# Patient Record
Sex: FEMALE | Race: BLACK OR AFRICAN AMERICAN | NOT HISPANIC OR LATINO
[De-identification: names, ages, dates, MRNs, and addresses within clinical notes are randomized per-mention and may not be internally consistent; named-entity substitution may affect disease eponyms.]

---

## 2019-03-28 PROBLEM — Z00.00 ENCOUNTER FOR PREVENTIVE HEALTH EXAMINATION: Status: ACTIVE | Noted: 2019-03-28

## 2019-04-03 ENCOUNTER — APPOINTMENT (OUTPATIENT)
Dept: SURGERY | Facility: CLINIC | Age: 74
End: 2019-04-03
Payer: MEDICARE

## 2019-04-03 VITALS
WEIGHT: 258 LBS | HEIGHT: 68 IN | TEMPERATURE: 97.8 F | BODY MASS INDEX: 39.1 KG/M2 | HEART RATE: 85 BPM | SYSTOLIC BLOOD PRESSURE: 145 MMHG | DIASTOLIC BLOOD PRESSURE: 82 MMHG

## 2019-04-03 DIAGNOSIS — I10 ESSENTIAL (PRIMARY) HYPERTENSION: ICD-10-CM

## 2019-04-03 PROCEDURE — 99204 OFFICE O/P NEW MOD 45 MIN: CPT

## 2019-04-03 NOTE — PLAN
[FreeTextEntry1] : I will review studies, consider needle biopsy vs exploration/excision. If this is a thyroglossal duct cyst, then excision would be indicated without biopsy. I will call the patient once I reviewed the films later this week. All questions answered.

## 2019-04-03 NOTE — PHYSICAL EXAM
[JVD] : no jugular venous distention  [Normal Thyroid] : the thyroid was normal [Carotid Bruits] : no carotid bruits [Normal Breath Sounds] : Normal breath sounds [Normal Heart Sounds] : normal heart sounds [Alert] : alert [Oriented to Person] : oriented to person [Oriented to Place] : oriented to place [Oriented to Time] : oriented to time [de-identified] : NAD [de-identified] : EOM/PERRLA sclera anicteric/no facial drooping [de-identified] : right fixed neck mass, bulge noted, nontender,

## 2019-04-03 NOTE — CONSULT LETTER
[Dear  ___] : Dear  [unfilled], [Please see my note below.] : Please see my note below. [FreeTextEntry1] : (Hilton Carpenter- I want to review the aura again but if this is a thyroglossal duct cyst, then excision is indicated. If not, then I'll consider needle biopsy first. I'll follow up with you. Thanks- Barrie)

## 2019-04-03 NOTE — HISTORY OF PRESENT ILLNESS
[de-identified] : The patient is referred by Dr. Esparza for evaluation of an enlarging right neck mass that the patient has noticed for a over a month. It is occasionally tender. She has no dysphagia or SOB and no symptoms of facial drooping or drooling. She feels well otherwise. she underwent a CT scan on 3//8/18 and thyroid ultrasound 3/18/19. There is question of a cystic structure on the right that may represent a thyroglossal duct. She does have multinodular goiter bilaterally . Within the differential on the imaging studies an enlarged lymph node or possible a muscle neoplasm (sarcoma) although this is less likely given the ultrasound findings of a cystic structure.

## 2019-04-10 ENCOUNTER — APPOINTMENT (OUTPATIENT)
Dept: SURGERY | Facility: CLINIC | Age: 74
End: 2019-04-10
Payer: MEDICARE

## 2019-04-10 VITALS
BODY MASS INDEX: 39.1 KG/M2 | TEMPERATURE: 98.2 F | HEART RATE: 91 BPM | WEIGHT: 258 LBS | HEIGHT: 68 IN | SYSTOLIC BLOOD PRESSURE: 163 MMHG | DIASTOLIC BLOOD PRESSURE: 86 MMHG

## 2019-04-10 PROCEDURE — 10006 FNA BX W/US GDN EA ADDL: CPT

## 2019-04-10 PROCEDURE — 99213 OFFICE O/P EST LOW 20 MIN: CPT | Mod: 25

## 2019-04-10 PROCEDURE — 10005 FNA BX W/US GDN 1ST LES: CPT | Mod: RT

## 2019-04-10 NOTE — HISTORY OF PRESENT ILLNESS
[de-identified] : 72 yo F referred by Dr. Call and Dr. Maki for evaluation of thyroid nodules and new right neck mass. She has a several year history of thyroid nodules, and a left one was biopsied some years ago and reported to be benign. Specifically, on the right lower pole there is a 1.1n5i5ob nodule (1.5x0.9x1.3 in 2016), a left middle pole 2.3x1.6x2.3cm nodule (previously 2.3x1.6x2.1cm in 2016) and left middle pole 1.9x1.5x1.9cm noldue (1.3x1.4x1.3cm in 2016). CT neck in 3/2019 showed a 3.8x2.8x3.1 mass adjacent to the right hyoid bone and right thyroid cartilage (without invasion into these structures). It is avascular on imaging and solid. Over the last 8-9 months, she has noticed a new right upper neck mass that started bothering her, especially when clothes a necklace rubbed it. She feels as if there is "no room" in her neck, and reports some pressure from this mass which she feels has been enlarging. She denies difficulty breathing or dysphagia. It is not sore or has been infected. SHe does report some SOB but feels it is lower in her chest. She denies changes in her voice or symptoms of hypo or hyperthyroidism. There is no history of radiation exposure. Her TSH in 2013 was 0.9 and she does not have more recent values but denies requiring thyroid medication. \par

## 2019-04-10 NOTE — PLAN
[FreeTextEntry1] : Will follow up biopsy results and discuss further management depending on these results. Should they be nondiagnostic, she may need a core needle biopsy of the right neck mass. Extent of surgery will depend on pathologic diagnosis.

## 2019-04-10 NOTE — CONSULT LETTER
[Dear  ___] : Dear  [unfilled], [Consult Letter:] : I had the pleasure of evaluating your patient, [unfilled]. [Consult Closing:] : Thank you very much for allowing me to participate in the care of this patient.  If you have any questions, please do not hesitate to contact me. [Please see my note below.] : Please see my note below. [Sincerely,] : Sincerely, [DrCory  ___] : Dr. AMADO [FreeTextEntry3] : Raisa Avelar MD

## 2019-04-10 NOTE — PHYSICAL EXAM
[Normal Rate and Rhythm] : normal rate and rhythm [Respiratory Effort] : normal respiratory effort [Oriented to Person] : oriented to person [Alert] : alert [Oriented to Place] : oriented to place [Oriented to Time] : oriented to time [Calm] : calm [de-identified] : No distress [de-identified] : palpable somewhat fixed right neck mass just anterior to submandibular gland. Under ultrasound, it appeared solid, well-circumscribed with innumerable hyperechoic foci. The thyroid nodules appeared benign on ultrasound, well-cicumscribed without microcalcifications [de-identified] : palpable bilateral thyroid nodules [de-identified] : walks with cane,left knee incision visible

## 2019-04-10 NOTE — ASSESSMENT
[FreeTextEntry1] : 74 yo F with multinodular goiter and new level II neck mass. Differential diagnosis includes thyroglossal duct cyst, thyroid neoplasm from remnant thyroid tissue or metastasis from thyroid cancer, primary muscle neoplasm. We discussed these possibilities and the need to obtain a tissue diagnosis to determine which operation is most appropriate. The patient is symptomatic from the right mass so it is reasonable to excise it, even if benign.

## 2019-04-10 NOTE — REVIEW OF SYSTEMS
[As Noted in HPI] : as noted in HPI [Fever] : no fever [Chills] : no chills [Feeling Poorly] : not feeling poorly [Feeling Tired] : not feeling tired [Hoarseness] : no hoarseness [Shortness Of Breath] : no shortness of breath [Cough] : no cough [Abdominal Pain] : no abdominal pain [FreeTextEntry5] : hypertension in office, usually normal

## 2019-04-26 ENCOUNTER — APPOINTMENT (OUTPATIENT)
Dept: SURGERY | Facility: CLINIC | Age: 74
End: 2019-04-26
Payer: MEDICARE

## 2019-04-26 VITALS
RESPIRATION RATE: 19 BRPM | HEART RATE: 92 BPM | DIASTOLIC BLOOD PRESSURE: 83 MMHG | TEMPERATURE: 98 F | BODY MASS INDEX: 38.8 KG/M2 | HEIGHT: 68 IN | WEIGHT: 256 LBS | SYSTOLIC BLOOD PRESSURE: 139 MMHG | OXYGEN SATURATION: 92 %

## 2019-04-26 PROCEDURE — 99203 OFFICE O/P NEW LOW 30 MIN: CPT | Mod: 25

## 2019-04-26 PROCEDURE — 31575 DIAGNOSTIC LARYNGOSCOPY: CPT

## 2019-04-26 RX ORDER — MULTIVITAMIN
TABLET ORAL
Refills: 0 | Status: ACTIVE | COMMUNITY

## 2019-04-26 NOTE — HISTORY OF PRESENT ILLNESS
[de-identified] : 1 year history of enlarging right neck mass. notes discomfort, dysphagia and occasional shortness of breath.  long standing (12 year) history of thyroid nodules, stable with benign cytology.  sonogram shows 4.5 cm complex right neck mass, bilateral thyroid nodules up to 1.5 cm right lobe and 2.3 cm left lobe. CT shows 38 mm mass between superior aspect thyroid cartilage and hyoid cartilage with extension into paraglottic space and displacement of carotid sheath. FNA c/w cyst contents -squamous cells and keratinocytes.

## 2019-04-26 NOTE — ASSESSMENT
[FreeTextEntry1] : discussed that needs core biopsy of mass, which may cause bleeding and further larynx edema, which I am not able to care for in the event of an emergency. asked to be evaluated by dr nicole at [facility]. d/w dr nicole and he will see pt at next office hours. instructed to go to nearest ER if becomes increasingly SOB.

## 2019-04-26 NOTE — PHYSICAL EXAM
[de-identified] : 3 cm tender right neck mass, adherent to laryngeal structures [de-identified] : 1 cm right and 2 cm left thyroid nodules, well circumscribed and mobile [Nasal Endoscopy Performed] : nasal endoscopy was performed, see procedure section for findings [Laryngoscopy Performed] : laryngoscopy was performed, see procedure section for findings [Midline] : located in midline position [Normal] : orientation to person, place, and time: normal [de-identified] : fiberoptic laryngoscopy shows edema of right AE fold with non visualization of vocal cords and floppy right arytenoid.

## 2019-04-26 NOTE — CONSULT LETTER
[Dear  ___] : Dear  [unfilled], [Consult Closing:] : Thank you very much for allowing me to participate in the care of this patient.  If you have any questions, please do not hesitate to contact me. [Please see my note below.] : Please see my note below. [Consult Letter:] : I had the pleasure of evaluating your patient, [unfilled]. [Sincerely,] : Sincerely, [FreeTextEntry2] : Dr. Raisa Avelar, Dr. Esparza [DrCory  ___] : Dr. AMADO [FreeTextEntry3] : Germán Rincon MD, FACS\par System Director, Endocrine Surgery\par [facility]\par

## 2019-04-29 ENCOUNTER — APPOINTMENT (OUTPATIENT)
Dept: OTOLARYNGOLOGY | Facility: CLINIC | Age: 74
End: 2019-04-29
Payer: MEDICARE

## 2019-04-29 VITALS
HEIGHT: 68 IN | OXYGEN SATURATION: 95 % | HEART RATE: 100 BPM | SYSTOLIC BLOOD PRESSURE: 155 MMHG | BODY MASS INDEX: 38.8 KG/M2 | WEIGHT: 256 LBS | DIASTOLIC BLOOD PRESSURE: 79 MMHG

## 2019-04-29 DIAGNOSIS — J38.00 PARALYSIS OF VOCAL CORDS AND LARYNX, UNSPECIFIED: ICD-10-CM

## 2019-04-29 DIAGNOSIS — Z86.39 PERSONAL HISTORY OF OTHER ENDOCRINE, NUTRITIONAL AND METABOLIC DISEASE: ICD-10-CM

## 2019-04-29 DIAGNOSIS — Z78.9 OTHER SPECIFIED HEALTH STATUS: ICD-10-CM

## 2019-04-29 DIAGNOSIS — R32 UNSPECIFIED URINARY INCONTINENCE: ICD-10-CM

## 2019-04-29 DIAGNOSIS — R06.02 SHORTNESS OF BREATH: ICD-10-CM

## 2019-04-29 DIAGNOSIS — R22.1 LOCALIZED SWELLING, MASS AND LUMP, NECK: ICD-10-CM

## 2019-04-29 DIAGNOSIS — J38.7 OTHER DISEASES OF LARYNX: ICD-10-CM

## 2019-04-29 DIAGNOSIS — Z87.39 PERSONAL HISTORY OF OTHER DISEASES OF THE MUSCULOSKELETAL SYSTEM AND CONNECTIVE TISSUE: ICD-10-CM

## 2019-04-29 DIAGNOSIS — Z87.891 PERSONAL HISTORY OF NICOTINE DEPENDENCE: ICD-10-CM

## 2019-04-29 DIAGNOSIS — Z87.11 PERSONAL HISTORY OF PEPTIC ULCER DISEASE: ICD-10-CM

## 2019-04-29 DIAGNOSIS — Z87.09 PERSONAL HISTORY OF OTHER DISEASES OF THE RESPIRATORY SYSTEM: ICD-10-CM

## 2019-04-29 DIAGNOSIS — Z87.898 PERSONAL HISTORY OF OTHER SPECIFIED CONDITIONS: ICD-10-CM

## 2019-04-29 DIAGNOSIS — R13.10 DYSPHAGIA, UNSPECIFIED: ICD-10-CM

## 2019-04-29 PROCEDURE — 99203 OFFICE O/P NEW LOW 30 MIN: CPT | Mod: 25

## 2019-04-29 PROCEDURE — 31575 DIAGNOSTIC LARYNGOSCOPY: CPT

## 2019-05-03 ENCOUNTER — OUTPATIENT (OUTPATIENT)
Dept: OUTPATIENT SERVICES | Facility: HOSPITAL | Age: 74
LOS: 1 days | End: 2019-05-03
Payer: MEDICARE

## 2019-05-03 ENCOUNTER — APPOINTMENT (OUTPATIENT)
Dept: RADIOLOGY | Facility: HOSPITAL | Age: 74
End: 2019-05-03

## 2019-05-03 DIAGNOSIS — R22.1 LOCALIZED SWELLING, MASS AND LUMP, NECK: ICD-10-CM

## 2019-05-06 ENCOUNTER — RESULT REVIEW (OUTPATIENT)
Age: 74
End: 2019-05-06

## 2019-05-06 LAB — NON-GYNECOLOGICAL CYTOLOGY STUDY: SIGNIFICANT CHANGE UP

## 2019-05-06 PROCEDURE — 88321 CONSLTJ&REPRT SLD PREP ELSWR: CPT

## 2019-05-07 ENCOUNTER — APPOINTMENT (OUTPATIENT)
Dept: OTOLARYNGOLOGY | Facility: HOSPITAL | Age: 74
End: 2019-05-07

## 2019-05-07 NOTE — ADDENDUM
[FreeTextEntry1] : Speech Pathology:\par \par Pt seen today in conjunction with Dr. Duval in [facility].\par \par Pt with enlarging R neck mass, noted to have a 38 mm mass between R thyroid and hyoid cartilages with extension into paraglottic space on CT scan in Mar 2019.  Pt presents today in clinic with 1 week history of dysphonia and dysphagia.  Dysphagia is characterized by pharyngeal stasis with solids which causes coughing on occasion.  Laryngoscopic exam showed significant amount of thick, foamy secretions in the pharynx/hypopharynx region.  Left TVF was mobile, could not reliably assess right TVF mobility.  A FEES was attempted but discontinued due to pr's reduced tolerance for procedure and difficulty getting a clear view 2/2 increased throat secretions.  \par \par Pt did not show overt signs/symptoms of aspiration on clinical swallow exam.  As such, pt recommended to be on a liquid diet for now pending MBSS that we will try to schedule ASAP.  Pt and daughter, who accompanied pt to this appointment, were both in agreement with this plan.\par \par Ilia MeiMS, CCC-SLP, BCS-S\par Board-Certified Specialist in Swallowing Disorders\par Senior Speech Pathologist\par

## 2019-05-07 NOTE — PROCEDURE
[Image(s) Captured] : image(s) captured and filed [Lesion] : lesion identified by mirror examination needing further evaluation [Dysphagia] : dysphagia not clearly evaluated by indirect laryngoscopy [None] : none [Flexible Endoscope] : examined with the flexible endoscope [de-identified] : flexible fiberoptic laryngoscope advanced orally, no oropharyngeal lesions identified, larynx visualized no and there is a right sided supraglottic mass with right BOT edema, paralysis of the right TVC and no movement of the arytenoid.  THe left supraglottis and glottic region is uninvolved and there is 5mm glotic opening, generalized secretions [de-identified] : mild stridor

## 2019-05-07 NOTE — HISTORY OF PRESENT ILLNESS
[de-identified] : 75 yo F referred by Dr Rincon for right sided neck mass.  She has a one year history of enlarging right neck mass. notes discomfort, dysphagia and occasional shortness of breath and since last Tuesday has had more consistent SOB.  She had a CT neck completed in March which shows 38 mm mass between superior aspect thyroid cartilage and hyoid cartilage with extension into paraglottic space and displacement of carotid sheath which may be delores in origin or cystic. An FNA was done recently which shows squamous cells and keratinocytes. She is still able to tolerate her own secretions, able to eat soft foods and drink liquids, has no weight loss but is eating less.  She has odynophagia and some mild-moderate dysphagia and has mild stridor with or without conversation\par \par Of note she has a long standing (12 year) history of thyroid nodules, stable with benign cytology. sonogram shows 4.5 cm complex right neck mass, bilateral thyroid nodules up to 1.5 cm right lobe and 2.3 cm left lobe.

## 2019-05-07 NOTE — PHYSICAL EXAM
[Midline] : trachea located in midline position [Laryngoscopy Performed] : laryngoscopy was performed, see procedure section for findings [Normal] : no rashes [de-identified] : there is a 3.4cm notable expansion of the  right neck in the paralaryngeal region/ the level II/III region which is fixed, tender and covered by normal overlying uninvolved skin.  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

## 2019-05-17 ENCOUNTER — MOBILE ON CALL (OUTPATIENT)
Age: 74
End: 2019-05-17

## 2019-06-06 ENCOUNTER — MOBILE ON CALL (OUTPATIENT)
Age: 74
End: 2019-06-06

## 2020-11-05 PROBLEM — R22.1 LOCALIZED SWELLING, MASS AND LUMP, NECK: Chronic | Status: ACTIVE | Noted: 2019-05-06

## 2020-11-18 ENCOUNTER — APPOINTMENT (OUTPATIENT)
Dept: BREAST CENTER | Facility: CLINIC | Age: 75
End: 2020-11-18
Payer: MEDICARE

## 2020-11-18 VITALS
BODY MASS INDEX: 40.01 KG/M2 | HEIGHT: 68 IN | SYSTOLIC BLOOD PRESSURE: 142 MMHG | DIASTOLIC BLOOD PRESSURE: 80 MMHG | WEIGHT: 264 LBS | HEART RATE: 58 BPM

## 2020-11-18 DIAGNOSIS — E04.2 NONTOXIC MULTINODULAR GOITER: ICD-10-CM

## 2020-11-18 DIAGNOSIS — L80 VITILIGO: ICD-10-CM

## 2020-11-18 DIAGNOSIS — Z80.0 FAMILY HISTORY OF MALIGNANT NEOPLASM OF DIGESTIVE ORGANS: ICD-10-CM

## 2020-11-18 DIAGNOSIS — R92.8 OTHER ABNORMAL AND INCONCLUSIVE FINDINGS ON DIAGNOSTIC IMAGING OF BREAST: ICD-10-CM

## 2020-11-18 DIAGNOSIS — Z78.9 OTHER SPECIFIED HEALTH STATUS: ICD-10-CM

## 2020-11-18 DIAGNOSIS — B36.9 SUPERFICIAL MYCOSIS, UNSPECIFIED: ICD-10-CM

## 2020-11-18 PROCEDURE — 99204 OFFICE O/P NEW MOD 45 MIN: CPT

## 2020-11-18 RX ORDER — NYSTATIN AND TRIAMCINOLONE ACETONIDE 100000; 1 MG/G; MG/G
100000-0.1 CREAM TOPICAL TWICE DAILY
Qty: 90 | Refills: 1 | Status: ACTIVE | COMMUNITY
Start: 2020-11-18 | End: 1900-01-01

## 2020-11-18 NOTE — REVIEW OF SYSTEMS
[Eyesight Problems] : eyesight problems [Eyes Itch] : itching of the eyes [Palpitations] : palpitations [SOB on Exertion] : shortness of breath during exertion [Abdominal Pain] : abdominal pain [Incontinence] : incontinence [Joint Pain] : joint pain [Joint Stiffness] : joint stiffness [Limb Pain] : limb pain [Skin Wound] : skin wound [Difficulty Walking] : difficulty walking [Negative] : Heme/Lymph

## 2020-11-25 RX ORDER — RANITIDINE 75 MG/1
TABLET ORAL
Refills: 0 | Status: ACTIVE | COMMUNITY

## 2020-11-25 RX ORDER — FLUTICASONE PROPIONATE 50 MCG
SPRAY, SUSPENSION NASAL
Refills: 0 | Status: ACTIVE | COMMUNITY

## 2020-11-25 NOTE — PHYSICAL EXAM
[Normocephalic] : normocephalic [Atraumatic] : atraumatic [Supple] : supple [No Supraclavicular Adenopathy] : no supraclavicular adenopathy [Examined in the supine and seated position] : examined in the supine and seated position [No dominant masses] : no dominant masses in right breast  [No dominant masses] : no dominant masses left breast [No Nipple Retraction] : no left nipple retraction [No Nipple Discharge] : no left nipple discharge [No Axillary Lymphadenopathy] : no left axillary lymphadenopathy [No Edema] : no edema [No Rashes] : no rashes [No Ulceration] : no ulceration [Soft] : abdomen soft [Not Tender] : non-tender [de-identified] : dry skin

## 2020-11-25 NOTE — HISTORY OF PRESENT ILLNESS
[FreeTextEntry1] : This is a 75 year old female referred by Dr. Esparza for abnormal imaging. The patient had screening imaging on 8/8/2020 and an asymmetry was seen in right breast then diagnostic imaging showed no ultrasound correlate to the asymmetry and she had breast MRI at [facility]. She presents for imaging review.\par She is a retired LPN.\par \par She has no breast complaints today.\par She does SBE. \par She has not noticed a change in her breast or a breast lump.\par She has not noticed a change in her nipple or nipple area.\par She has not noticed a change in the skin of the breast.\par She is not experiencing nipple discharge.\par She is not experiencing breast pain.\par She has not noticed a lump or lymph node under the armpit. \par \par BREAST CANCER RISK FACTORS\par Menarche: 14\par Date of LMP: 2002\par Menopause: post age 57\par Grav:  5    Para: 3\par Age at first live birth: 23\par Nursed: no\par Hysterectomy: no\par Oophorectomy:  yes unilateral 1996\par OCP: yes in the past for approximately 2 years\par HRT: no\par Last pap/pelvic exam: 2018 WNL \par Related family history: mother pancreatic cancer 70's, possible also breast cancer\par Ashkenazi: no \par Mastery risk assessment: BRCAPRO 4.9%, TCv7 10.8%, TCv8 11.1%, Micki 6.6%, Moise 1.9%\par BRCA testing: no\par Bra size: 44C\par \par Last mammogram: 8/25/2020  right      Location: [facility] \par Report reviewed.                                 Images reviewed.\par Results: BIRADS 0\par scattered fibroglandular tissue. In the right inner breast, 4cm from the nipple there is a 4mm circumscribed  asymmetry. in the right inner breast, 9.5cm from the nipple, there is an elongated asymmetry spanning 6mm.\par \par Last ultrasound: 8/25/2020 right           Location: [facility] \par Report reviewed.                                 Images reviewed. \par Results: BIRADS 2\par in the right 5:00 axis, 3cm from the nipple there is a cyst measuring 0.4 x 0.3 x 0.2cm. In the right 3:00 axis, 4cm from the nipple, there is a cyst measuring 0.3 x 0.4 x 0.2cm. This likely corresponds with the mammographic finding. \par \par Last MRI:   11/6/2020                           Location: [facility]\par Report reviewed                                                \par Results: NO evidence of malignancy bilaterally. BIRADS 2

## 2020-11-25 NOTE — CONSULT LETTER
[Dear  ___] : Dear  [unfilled], [( Thank you for referring [unfilled] for consultation for _____ )] : Thank you for referring [unfilled] for consultation for [unfilled] [Please see my note below.] : Please see my note below. [Consult Closing:] : Thank you very much for allowing me to participate in the care of this patient.  If you have any questions, please do not hesitate to contact me. [Sincerely,] : Sincerely, [FreeTextEntry3] : Ashely Marquez MS DO\par Breast Surgeon\par [facility] \[place] [address]\par

## 2020-11-25 NOTE — ASSESSMENT
[FreeTextEntry1] : 76 yo female with abnormal imaging\par I am recommend 2nd op review by Dr. Duckworth of the MRI, I explained to Willy that we are limited with outside read of MRI but that I would want to make sure the radiologist sees the MRI\par reassured her that her PE is WNL\par reviewed her risk status, she is not high risk\par We reviewed risk reduction strategies including maintaining a BMI <25, limiting red meat intake and alcoholic beverages to 3 per week and exercise (150 min/ week low intensity or 75 min/week high intensity). And maintaining a normal vitamin D level.\par \par I will call her with imaging review and recommendations.\par Also reviewed with her that [facility] has a new MRI machine\par \par reviewed option of genetic counseling/testing she is interested and referral to [facility] given history of pancreatic and breast cancer in her mother\par \par Will devise follow up plan pending radiology review.\par She knows to call or return sooner should any concerns or questions arise.\par

## 2020-12-09 ENCOUNTER — NON-APPOINTMENT (OUTPATIENT)
Age: 75
End: 2020-12-09

## 2022-11-04 ENCOUNTER — OFFICE (OUTPATIENT)
Dept: URBAN - METROPOLITAN AREA CLINIC 122 | Facility: CLINIC | Age: 77
Setting detail: OPHTHALMOLOGY
End: 2022-11-04
Payer: MEDICARE

## 2022-11-04 DIAGNOSIS — H02.825: ICD-10-CM

## 2022-11-04 DIAGNOSIS — H02.822: ICD-10-CM

## 2022-11-04 PROCEDURE — 92012 INTRM OPH EXAM EST PATIENT: CPT | Performed by: OPHTHALMOLOGY

## 2022-11-04 ASSESSMENT — REFRACTION_AUTOREFRACTION
OS_CYLINDER: -0.75
OD_AXIS: 150
OD_SPHERE: +1.50
OS_SPHERE: +1.50
OS_AXIS: 48
OD_CYLINDER: -0.75

## 2022-11-04 ASSESSMENT — REFRACTION_CURRENTRX
OS_CYLINDER: SPH
OD_VPRISM_DIRECTION: SV
OD_CYLINDER: SPH
OS_SPHERE: +2.50
OD_SPHERE: +2.50
OS_OVR_VA: 20/
OD_OVR_VA: 20/
OS_VPRISM_DIRECTION: SV

## 2022-11-04 ASSESSMENT — LID EXAM ASSESSMENTS
OD_BLEPHARITIS: RUL 3+
OS_BLEPHARITIS: LUL 3+

## 2022-11-04 ASSESSMENT — REFRACTION_MANIFEST
OS_CYLINDER: -0.75
OS_ADD: +2.50
OD_VA1: 20/30+
OS_AXIS: 50
OD_AXIS: 150
OD_SPHERE: +1.50
OD_CYLINDER: -0.75
OS_VA1: 20/30+
OD_ADD: +2.50
OS_SPHERE: +1.50

## 2022-11-04 ASSESSMENT — SUPERFICIAL PUNCTATE KERATITIS (SPK)
OD_SPK: 2+
OS_SPK: 2+

## 2022-11-04 ASSESSMENT — SPHEQUIV_DERIVED
OD_SPHEQUIV: 1.125
OS_SPHEQUIV: 1.125
OS_SPHEQUIV: 1.125
OD_SPHEQUIV: 1.125

## 2022-11-04 ASSESSMENT — VISUAL ACUITY
OD_BCVA: 20/30-2
OS_BCVA: 20/40-2

## 2023-04-19 ENCOUNTER — OFFICE (OUTPATIENT)
Dept: URBAN - METROPOLITAN AREA CLINIC 122 | Facility: CLINIC | Age: 78
Setting detail: OPHTHALMOLOGY
End: 2023-04-19
Payer: MEDICARE

## 2023-04-19 DIAGNOSIS — H16.223: ICD-10-CM

## 2023-04-19 DIAGNOSIS — H02.822: ICD-10-CM

## 2023-04-19 DIAGNOSIS — H02.825: ICD-10-CM

## 2023-04-19 DIAGNOSIS — H01.004: ICD-10-CM

## 2023-04-19 DIAGNOSIS — H01.001: ICD-10-CM

## 2023-04-19 PROCEDURE — 68761 CLOSE TEAR DUCT OPENING: CPT | Performed by: OPHTHALMOLOGY

## 2023-04-19 PROCEDURE — 92012 INTRM OPH EXAM EST PATIENT: CPT | Performed by: OPHTHALMOLOGY

## 2023-04-19 ASSESSMENT — VISUAL ACUITY
OS_BCVA: 20/30-
OD_BCVA: 20/30+

## 2023-04-19 ASSESSMENT — LID EXAM ASSESSMENTS
OD_BLEPHARITIS: RUL 3+
OS_BLEPHARITIS: LUL 3+

## 2023-04-19 ASSESSMENT — SPHEQUIV_DERIVED
OD_SPHEQUIV: 1.125
OS_SPHEQUIV: 1.125
OS_SPHEQUIV: 1.125
OD_SPHEQUIV: 1.125

## 2023-04-19 ASSESSMENT — REFRACTION_CURRENTRX
OD_VPRISM_DIRECTION: SV
OS_SPHERE: +2.50
OS_OVR_VA: 20/
OD_SPHERE: +2.50
OS_CYLINDER: SPH
OD_CYLINDER: SPH
OS_VPRISM_DIRECTION: SV
OD_OVR_VA: 20/

## 2023-04-19 ASSESSMENT — SUPERFICIAL PUNCTATE KERATITIS (SPK)
OD_SPK: 2+
OS_SPK: 2+

## 2023-04-19 ASSESSMENT — REFRACTION_MANIFEST
OS_AXIS: 50
OD_CYLINDER: -0.75
OD_AXIS: 150
OS_ADD: +2.50
OD_ADD: +2.50
OD_SPHERE: +1.50
OS_SPHERE: +1.50
OS_VA1: 20/30+
OD_VA1: 20/30+
OS_CYLINDER: -0.75

## 2023-04-19 ASSESSMENT — REFRACTION_AUTOREFRACTION
OD_AXIS: 150
OD_SPHERE: +1.50
OD_CYLINDER: -0.75
OS_CYLINDER: -0.75
OS_AXIS: 48
OS_SPHERE: +1.50

## 2023-10-10 ENCOUNTER — OFFICE (OUTPATIENT)
Dept: URBAN - METROPOLITAN AREA CLINIC 122 | Facility: CLINIC | Age: 78
Setting detail: OPHTHALMOLOGY
End: 2023-10-10
Payer: MEDICARE

## 2023-10-10 DIAGNOSIS — H02.825: ICD-10-CM

## 2023-10-10 DIAGNOSIS — H01.004: ICD-10-CM

## 2023-10-10 DIAGNOSIS — H02.822: ICD-10-CM

## 2023-10-10 DIAGNOSIS — H16.223: ICD-10-CM

## 2023-10-10 DIAGNOSIS — H01.001: ICD-10-CM

## 2023-10-10 PROCEDURE — 68761 CLOSE TEAR DUCT OPENING: CPT | Mod: 50 | Performed by: OPHTHALMOLOGY

## 2023-10-10 PROCEDURE — 99204 OFFICE O/P NEW MOD 45 MIN: CPT | Mod: 25 | Performed by: OPHTHALMOLOGY

## 2023-10-10 ASSESSMENT — REFRACTION_MANIFEST
OS_SPHERE: +1.50
OD_SPHERE: +1.50
OD_ADD: +2.50
OD_SPHERE: +0.75
OS_CYLINDER: -0.75
OD_VA1: 20/40
OD_CYLINDER: -0.75
OS_VA1: 20/40
OS_ADD: +2.50
OS_VA1: 20/30+
OD_AXIS: 150
OS_CYLINDER: -0.75
OS_AXIS: 40
OD_VA1: 20/30+
OD_CYLINDER: SPH
OD_ADD: +2.50
OS_ADD: +2.50
OS_AXIS: 50
OS_SPHERE: +1.50

## 2023-10-10 ASSESSMENT — SPHEQUIV_DERIVED
OS_SPHEQUIV: 1.125
OS_SPHEQUIV: 1.125
OD_SPHEQUIV: 1.125
OD_SPHEQUIV: 1.125
OS_SPHEQUIV: 1.125

## 2023-10-10 ASSESSMENT — REFRACTION_CURRENTRX
OD_SPHERE: +2.50
OS_SPHERE: +2.50
OD_CYLINDER: SPH
OD_OVR_VA: 20/
OS_OVR_VA: 20/
OS_VPRISM_DIRECTION: SV
OS_CYLINDER: SPH
OD_VPRISM_DIRECTION: SV

## 2023-10-10 ASSESSMENT — SUPERFICIAL PUNCTATE KERATITIS (SPK)
OS_SPK: 2+
OD_SPK: 2+

## 2023-10-10 ASSESSMENT — REFRACTION_AUTOREFRACTION
OS_CYLINDER: -0.75
OS_AXIS: 48
OD_SPHERE: +1.50
OD_CYLINDER: -0.75
OD_AXIS: 150
OS_SPHERE: +1.50

## 2023-10-10 ASSESSMENT — CONFRONTATIONAL VISUAL FIELD TEST (CVF)
OD_FINDINGS: FULL
OS_FINDINGS: FULL

## 2023-10-10 ASSESSMENT — LID EXAM ASSESSMENTS
OD_BLEPHARITIS: RUL 3+
OS_BLEPHARITIS: LUL 3+

## 2023-10-10 ASSESSMENT — VISUAL ACUITY
OD_BCVA: 20/50
OS_BCVA: 20/50+2

## 2023-10-16 ENCOUNTER — OFFICE (OUTPATIENT)
Dept: URBAN - METROPOLITAN AREA CLINIC 122 | Facility: CLINIC | Age: 78
Setting detail: OPHTHALMOLOGY
End: 2023-10-16
Payer: MEDICARE

## 2023-10-16 DIAGNOSIS — H35.363: ICD-10-CM

## 2023-10-16 DIAGNOSIS — H16.223: ICD-10-CM

## 2023-10-16 DIAGNOSIS — H02.822: ICD-10-CM

## 2023-10-16 DIAGNOSIS — H01.004: ICD-10-CM

## 2023-10-16 DIAGNOSIS — H25.13: ICD-10-CM

## 2023-10-16 DIAGNOSIS — H25.12: ICD-10-CM

## 2023-10-16 DIAGNOSIS — H02.825: ICD-10-CM

## 2023-10-16 DIAGNOSIS — H01.001: ICD-10-CM

## 2023-10-16 PROBLEM — H25.11 CATARACT SENILE NUCLEAR SCLEROSIS; RIGHT EYE, LEFT EYE, BOTH EYES: Status: ACTIVE | Noted: 2023-10-16

## 2023-10-16 PROCEDURE — 99214 OFFICE O/P EST MOD 30 MIN: CPT | Mod: 24 | Performed by: OPHTHALMOLOGY

## 2023-10-16 PROCEDURE — 92136 OPHTHALMIC BIOMETRY: CPT | Mod: LT | Performed by: OPHTHALMOLOGY

## 2023-10-16 PROCEDURE — 92134 CPTRZ OPH DX IMG PST SGM RTA: CPT | Performed by: OPHTHALMOLOGY

## 2023-10-16 ASSESSMENT — REFRACTION_MANIFEST
OS_SPHERE: +1.50
OD_AXIS: 150
OD_ADD: +2.50
OD_ADD: +2.50
OS_AXIS: 40
OD_VA1: 20/30+
OS_ADD: +2.50
OS_CYLINDER: -0.75
OS_ADD: +2.50
OD_CYLINDER: SPH
OS_CYLINDER: -0.75
OD_CYLINDER: -0.75
OD_SPHERE: +0.75
OS_SPHERE: +1.50
OD_SPHERE: +1.50
OD_VA1: 20/40
OS_VA1: 20/30+
OS_AXIS: 50
OS_VA1: 20/40

## 2023-10-16 ASSESSMENT — LID EXAM ASSESSMENTS
OD_BLEPHARITIS: RUL 3+
OS_BLEPHARITIS: LUL 3+

## 2023-10-16 ASSESSMENT — REFRACTION_CURRENTRX
OD_CYLINDER: SPH
OD_OVR_VA: 20/
OS_VPRISM_DIRECTION: SV
OS_SPHERE: +2.50
OS_CYLINDER: SPH
OS_OVR_VA: 20/
OD_SPHERE: +2.50
OD_VPRISM_DIRECTION: SV

## 2023-10-16 ASSESSMENT — SPHEQUIV_DERIVED
OS_SPHEQUIV: 1.125
OD_SPHEQUIV: 1.125
OS_SPHEQUIV: 1.125
OD_SPHEQUIV: 1.125
OS_SPHEQUIV: 1.125

## 2023-10-16 ASSESSMENT — SUPERFICIAL PUNCTATE KERATITIS (SPK)
OD_SPK: 2+
OS_SPK: 2+

## 2023-10-16 ASSESSMENT — VISUAL ACUITY
OS_BCVA: 20/50+2
OD_BCVA: 20/50

## 2023-10-16 ASSESSMENT — REFRACTION_AUTOREFRACTION
OS_AXIS: 48
OD_AXIS: 150
OD_CYLINDER: -0.75
OS_SPHERE: +1.50
OS_CYLINDER: -0.75
OD_SPHERE: +1.50

## 2023-11-09 ENCOUNTER — AMBULATORY SURGERY CENTER (OUTPATIENT)
Dept: URBAN - METROPOLITAN AREA SURGERY 16 | Facility: SURGERY | Age: 78
Setting detail: OPHTHALMOLOGY
End: 2023-11-09
Payer: MEDICARE

## 2023-11-09 DIAGNOSIS — H25.12: ICD-10-CM

## 2023-11-09 PROCEDURE — 66984 XCAPSL CTRC RMVL W/O ECP: CPT | Mod: LT | Performed by: OPHTHALMOLOGY

## 2023-11-10 ENCOUNTER — RX ONLY (RX ONLY)
Age: 78
End: 2023-11-10

## 2023-11-10 ENCOUNTER — OFFICE (OUTPATIENT)
Dept: URBAN - METROPOLITAN AREA CLINIC 122 | Facility: CLINIC | Age: 78
Setting detail: OPHTHALMOLOGY
End: 2023-11-10
Payer: MEDICARE

## 2023-11-10 DIAGNOSIS — Z96.1: ICD-10-CM

## 2023-11-10 PROCEDURE — 99024 POSTOP FOLLOW-UP VISIT: CPT | Performed by: OPHTHALMOLOGY

## 2023-11-10 ASSESSMENT — REFRACTION_MANIFEST
OS_ADD: +2.50
OS_AXIS: 40
OS_VA1: 20/30+
OD_CYLINDER: -0.75
OD_ADD: +2.50
OS_SPHERE: +1.50
OS_CYLINDER: -0.75
OD_ADD: +2.50
OD_CYLINDER: SPH
OD_SPHERE: +1.50
OS_SPHERE: +1.50
OS_AXIS: 50
OD_SPHERE: +0.75
OD_VA1: 20/30+
OD_VA1: 20/40
OS_CYLINDER: -0.75
OD_AXIS: 150
OS_VA1: 20/40
OS_ADD: +2.50

## 2023-11-10 ASSESSMENT — REFRACTION_AUTOREFRACTION
OS_SPHERE: PLANO
OD_AXIS: 150
OD_CYLINDER: -0.75
OS_AXIS: 12
OS_CYLINDER: -1.00
OD_SPHERE: +1.50

## 2023-11-10 ASSESSMENT — REFRACTION_CURRENTRX
OS_VPRISM_DIRECTION: SV
OD_VPRISM_DIRECTION: SV
OS_CYLINDER: SPH
OD_CYLINDER: SPH
OS_OVR_VA: 20/
OD_SPHERE: +2.50
OS_SPHERE: +2.50
OD_OVR_VA: 20/

## 2023-11-10 ASSESSMENT — SUPERFICIAL PUNCTATE KERATITIS (SPK)
OD_SPK: 2+
OS_SPK: 2+

## 2023-11-10 ASSESSMENT — SPHEQUIV_DERIVED
OD_SPHEQUIV: 1.125
OS_SPHEQUIV: 1.125
OS_SPHEQUIV: 1.125
OD_SPHEQUIV: 1.125

## 2023-11-10 ASSESSMENT — LID EXAM ASSESSMENTS
OD_BLEPHARITIS: RUL 3+
OS_BLEPHARITIS: LUL 3+

## 2023-11-20 ENCOUNTER — OFFICE (OUTPATIENT)
Dept: URBAN - METROPOLITAN AREA CLINIC 122 | Facility: CLINIC | Age: 78
Setting detail: OPHTHALMOLOGY
End: 2023-11-20
Payer: MEDICARE

## 2023-11-20 DIAGNOSIS — H25.11: ICD-10-CM

## 2023-11-20 PROBLEM — Z96.1 PSEUDOPHAKIA-2 WEEKS P/O CAT W/ IOL ; LEFT EYE: Status: ACTIVE | Noted: 2023-11-10

## 2023-11-20 PROCEDURE — 92136 OPHTHALMIC BIOMETRY: CPT | Mod: RT | Performed by: OPHTHALMOLOGY

## 2023-11-20 ASSESSMENT — REFRACTION_MANIFEST
OS_SPHERE: +1.50
OD_SPHERE: +1.50
OD_ADD: +2.50
OS_CYLINDER: -0.75
OD_CYLINDER: SPH
OD_AXIS: 150
OD_ADD: +2.50
OS_ADD: +2.50
OS_AXIS: 40
OD_VA1: 20/40
OS_VA1: 20/30+
OD_CYLINDER: -0.75
OS_AXIS: 50
OD_VA1: 20/30+
OS_VA1: 20/40
OS_ADD: +2.50
OD_SPHERE: +0.75
OS_SPHERE: +1.50
OS_CYLINDER: -0.75

## 2023-11-20 ASSESSMENT — SUPERFICIAL PUNCTATE KERATITIS (SPK)
OD_SPK: 2+
OS_SPK: 2+

## 2023-11-20 ASSESSMENT — REFRACTION_CURRENTRX
OS_CYLINDER: SPH
OS_SPHERE: +2.50
OD_VPRISM_DIRECTION: SV
OD_CYLINDER: SPH
OS_VPRISM_DIRECTION: SV
OD_SPHERE: +2.50
OS_OVR_VA: 20/
OD_OVR_VA: 20/

## 2023-11-20 ASSESSMENT — LID EXAM ASSESSMENTS
OS_BLEPHARITIS: LUL 3+
OD_BLEPHARITIS: RUL 3+

## 2023-11-20 ASSESSMENT — SPHEQUIV_DERIVED
OS_SPHEQUIV: 1.125
OS_SPHEQUIV: 1.125
OD_SPHEQUIV: 0.875
OD_SPHEQUIV: 1.125

## 2023-11-20 ASSESSMENT — REFRACTION_AUTOREFRACTION
OS_AXIS: 24
OD_AXIS: 167
OS_CYLINDER: -0.50
OD_SPHERE: +1.00
OS_SPHERE: PLANO
OD_CYLINDER: -0.25

## 2023-12-14 ENCOUNTER — AMBULATORY SURGERY CENTER (OUTPATIENT)
Dept: URBAN - METROPOLITAN AREA SURGERY 16 | Facility: SURGERY | Age: 78
Setting detail: OPHTHALMOLOGY
End: 2023-12-14
Payer: MEDICARE

## 2023-12-14 DIAGNOSIS — H25.11: ICD-10-CM

## 2023-12-14 PROCEDURE — 66984 XCAPSL CTRC RMVL W/O ECP: CPT | Mod: 79,RT | Performed by: OPHTHALMOLOGY

## 2023-12-15 ENCOUNTER — OFFICE (OUTPATIENT)
Dept: URBAN - METROPOLITAN AREA CLINIC 122 | Facility: CLINIC | Age: 78
Setting detail: OPHTHALMOLOGY
End: 2023-12-15
Payer: MEDICARE

## 2023-12-15 DIAGNOSIS — Z96.1: ICD-10-CM

## 2023-12-15 PROCEDURE — 99024 POSTOP FOLLOW-UP VISIT: CPT | Performed by: OPHTHALMOLOGY

## 2023-12-15 ASSESSMENT — REFRACTION_AUTOREFRACTION
OD_AXIS: 171
OS_SPHERE: PLANO
OD_SPHERE: PLANO
OS_CYLINDER: -0.50
OD_CYLINDER: -0.75
OS_AXIS: 36

## 2023-12-15 ASSESSMENT — REFRACTION_MANIFEST
OS_SPHERE: +1.50
OS_SPHERE: +1.50
OS_AXIS: 40
OD_ADD: +2.50
OS_CYLINDER: -0.75
OS_ADD: +2.50
OD_CYLINDER: SPH
OD_SPHERE: +0.75
OD_SPHERE: +1.50
OD_VA1: 20/30+
OD_AXIS: 150
OS_VA1: 20/40
OS_VA1: 20/30+
OD_VA1: 20/40
OD_ADD: +2.50
OD_CYLINDER: -0.75
OS_AXIS: 50
OS_CYLINDER: -0.75
OS_ADD: +2.50

## 2023-12-15 ASSESSMENT — REFRACTION_CURRENTRX
OD_CYLINDER: SPH
OD_VPRISM_DIRECTION: SV
OD_SPHERE: +2.50
OS_OVR_VA: 20/
OS_CYLINDER: SPH
OS_SPHERE: +2.50
OD_OVR_VA: 20/
OS_VPRISM_DIRECTION: SV

## 2023-12-15 ASSESSMENT — SUPERFICIAL PUNCTATE KERATITIS (SPK)
OD_SPK: 2+
OS_SPK: 2+

## 2023-12-15 ASSESSMENT — LID EXAM ASSESSMENTS
OS_BLEPHARITIS: LUL 3+
OD_BLEPHARITIS: RUL 3+

## 2023-12-15 ASSESSMENT — CONFRONTATIONAL VISUAL FIELD TEST (CVF)
OD_FINDINGS: FULL
OS_FINDINGS: FULL

## 2023-12-15 ASSESSMENT — SPHEQUIV_DERIVED
OS_SPHEQUIV: 1.125
OD_SPHEQUIV: 1.125
OS_SPHEQUIV: 1.125

## 2024-01-10 ENCOUNTER — OFFICE (OUTPATIENT)
Dept: URBAN - METROPOLITAN AREA CLINIC 122 | Facility: CLINIC | Age: 79
Setting detail: OPHTHALMOLOGY
End: 2024-01-10
Payer: MEDICARE

## 2024-01-10 DIAGNOSIS — Z96.1: ICD-10-CM

## 2024-01-10 PROCEDURE — 99024 POSTOP FOLLOW-UP VISIT: CPT

## 2024-01-10 ASSESSMENT — REFRACTION_MANIFEST
OS_SPHERE: +1.50
OD_VA1: 20/40
OS_VA1: 20/40
OD_SPHERE: +1.50
OD_VA1: 20/30+
OD_ADD: +2.50
OS_CYLINDER: -0.75
OS_VA1: 20/30+
OD_ADD: +2.50
OD_CYLINDER: -0.75
OS_ADD: +2.50
OD_SPHERE: +0.75
OS_AXIS: 50
OS_SPHERE: +1.50
OS_SPHERE: PLANO
OD_CYLINDER: SPH
OD_AXIS: 150
OS_AXIS: 40
OS_ADD: +2.50
OS_ADD: +2.50
OD_ADD: +2.50
OD_SPHERE: PLANO
OS_CYLINDER: -0.75

## 2024-01-10 ASSESSMENT — REFRACTION_CURRENTRX
OS_CYLINDER: SPH
OS_OVR_VA: 20/
OD_CYLINDER: SPH
OD_VPRISM_DIRECTION: SV
OS_SPHERE: +2.50
OS_VPRISM_DIRECTION: SV
OD_SPHERE: +2.50
OD_OVR_VA: 20/

## 2024-01-10 ASSESSMENT — REFRACTION_AUTOREFRACTION
OD_SPHERE: +0.75
OD_AXIS: 156
OS_AXIS: 10
OS_SPHERE: +0.25
OD_CYLINDER: -0.75
OS_CYLINDER: -0.75

## 2024-01-10 ASSESSMENT — LID EXAM ASSESSMENTS
OS_BLEPHARITIS: LUL 3+
OD_BLEPHARITIS: RUL 3+

## 2024-01-10 ASSESSMENT — SPHEQUIV_DERIVED
OS_SPHEQUIV: -0.125
OS_SPHEQUIV: 1.125
OD_SPHEQUIV: 0.375
OS_SPHEQUIV: 1.125
OD_SPHEQUIV: 1.125

## 2024-01-10 ASSESSMENT — SUPERFICIAL PUNCTATE KERATITIS (SPK)
OS_SPK: 2+
OD_SPK: 2+

## 2024-03-13 ENCOUNTER — OFFICE (OUTPATIENT)
Dept: URBAN - METROPOLITAN AREA CLINIC 122 | Facility: CLINIC | Age: 79
Setting detail: OPHTHALMOLOGY
End: 2024-03-13
Payer: MEDICARE

## 2024-03-13 DIAGNOSIS — H02.822: ICD-10-CM

## 2024-03-13 DIAGNOSIS — H02.825: ICD-10-CM

## 2024-03-13 DIAGNOSIS — H16.223: ICD-10-CM

## 2024-03-13 DIAGNOSIS — H01.001: ICD-10-CM

## 2024-03-13 DIAGNOSIS — H35.363: ICD-10-CM

## 2024-03-13 PROBLEM — E11.9 DIABETES TYPE 2 NO RETINOPATHY ; BOTH EYES: Status: ACTIVE | Noted: 2024-03-13

## 2024-03-13 PROCEDURE — 92014 COMPRE OPH EXAM EST PT 1/>: CPT | Mod: 24,25 | Performed by: OPHTHALMOLOGY

## 2024-03-13 PROCEDURE — 92250 FUNDUS PHOTOGRAPHY W/I&R: CPT | Performed by: OPHTHALMOLOGY

## 2024-03-13 PROCEDURE — 68761 CLOSE TEAR DUCT OPENING: CPT | Mod: 79,50 | Performed by: OPHTHALMOLOGY

## 2024-03-13 ASSESSMENT — SPHEQUIV_DERIVED
OS_SPHEQUIV: 1.125
OD_SPHEQUIV: 1.125
OS_SPHEQUIV: 1.125

## 2024-03-13 ASSESSMENT — REFRACTION_CURRENTRX
OS_CYLINDER: SPH
OS_VPRISM_DIRECTION: SV
OD_SPHERE: +2.50
OS_SPHERE: +2.50
OD_CYLINDER: SPH
OD_OVR_VA: 20/
OS_OVR_VA: 20/
OD_VPRISM_DIRECTION: SV

## 2024-03-13 ASSESSMENT — REFRACTION_MANIFEST
OD_ADD: +2.50
OS_SPHERE: PLANO
OS_CYLINDER: -0.75
OD_AXIS: 150
OD_SPHERE: +0.75
OS_AXIS: 50
OD_SPHERE: +1.50
OS_ADD: +2.50
OS_SPHERE: +1.50
OD_CYLINDER: -0.75
OD_VA1: 20/30+
OS_CYLINDER: -0.75
OD_CYLINDER: SPH
OD_ADD: +2.50
OD_SPHERE: PLANO
OS_SPHERE: +1.50
OD_VA1: 20/40
OD_ADD: +2.50
OS_AXIS: 40
OS_ADD: +2.50
OS_ADD: +2.50
OS_VA1: 20/40
OS_VA1: 20/30+

## 2024-03-13 ASSESSMENT — LID EXAM ASSESSMENTS
OS_BLEPHARITIS: LUL 3+
OD_BLEPHARITIS: RUL 3+

## 2024-09-18 ENCOUNTER — OFFICE (OUTPATIENT)
Dept: URBAN - METROPOLITAN AREA CLINIC 122 | Facility: CLINIC | Age: 79
Setting detail: OPHTHALMOLOGY
End: 2024-09-18
Payer: MEDICARE

## 2024-09-18 DIAGNOSIS — H01.004: ICD-10-CM

## 2024-09-18 DIAGNOSIS — H01.001: ICD-10-CM

## 2024-09-18 DIAGNOSIS — H16.223: ICD-10-CM

## 2024-09-18 DIAGNOSIS — H02.822: ICD-10-CM

## 2024-09-18 DIAGNOSIS — H35.363: ICD-10-CM

## 2024-09-18 DIAGNOSIS — H02.825: ICD-10-CM

## 2024-09-18 PROCEDURE — 92012 INTRM OPH EXAM EST PATIENT: CPT | Mod: 25 | Performed by: OPHTHALMOLOGY

## 2024-09-18 PROCEDURE — 92134 CPTRZ OPH DX IMG PST SGM RTA: CPT | Performed by: OPHTHALMOLOGY

## 2024-09-18 PROCEDURE — 68761 CLOSE TEAR DUCT OPENING: CPT | Mod: 50 | Performed by: OPHTHALMOLOGY

## 2024-09-18 ASSESSMENT — LID EXAM ASSESSMENTS
OS_BLEPHARITIS: LUL 3+
OD_BLEPHARITIS: RUL 3+

## 2025-03-19 ENCOUNTER — OFFICE (OUTPATIENT)
Dept: URBAN - METROPOLITAN AREA CLINIC 122 | Facility: CLINIC | Age: 80
Setting detail: OPHTHALMOLOGY
End: 2025-03-19
Payer: MEDICARE

## 2025-03-19 DIAGNOSIS — H01.001: ICD-10-CM

## 2025-03-19 DIAGNOSIS — H35.363: ICD-10-CM

## 2025-03-19 DIAGNOSIS — H16.223: ICD-10-CM

## 2025-03-19 DIAGNOSIS — H02.822: ICD-10-CM

## 2025-03-19 DIAGNOSIS — H02.825: ICD-10-CM

## 2025-03-19 PROCEDURE — 92014 COMPRE OPH EXAM EST PT 1/>: CPT | Mod: 25 | Performed by: OPHTHALMOLOGY

## 2025-03-19 PROCEDURE — 92250 FUNDUS PHOTOGRAPHY W/I&R: CPT | Performed by: OPHTHALMOLOGY

## 2025-03-19 PROCEDURE — 68761 CLOSE TEAR DUCT OPENING: CPT | Mod: 50 | Performed by: OPHTHALMOLOGY

## 2025-03-19 ASSESSMENT — REFRACTION_MANIFEST
OS_ADD: +2.50
OD_ADD: +2.50
OD_ADD: +2.50
OS_VA1: 20/30+
OS_ADD: +2.50
OD_SPHERE: PLANO
OS_VA1: 20/40
OD_VA1: 20/30+
OS_SPHERE: PLANO
OD_AXIS: 150
OD_ADD: +2.50
OS_ADD: +2.50
OD_SPHERE: +0.75
OD_SPHERE: +1.50
OS_SPHERE: +1.50
OD_CYLINDER: -0.75
OS_CYLINDER: -0.75
OD_VA1: 20/40
OS_AXIS: 40
OS_SPHERE: +1.50
OS_CYLINDER: -0.75
OD_CYLINDER: SPH
OS_AXIS: 50

## 2025-03-19 ASSESSMENT — REFRACTION_CURRENTRX
OS_SPHERE: +2.50
OD_CYLINDER: SPH
OS_VPRISM_DIRECTION: SV
OD_OVR_VA: 20/
OS_OVR_VA: 20/
OD_VPRISM_DIRECTION: SV
OS_CYLINDER: SPH
OD_SPHERE: +2.50

## 2025-03-19 ASSESSMENT — REFRACTION_AUTOREFRACTION
OS_AXIS: 10
OD_SPHERE: +0.75
OD_AXIS: 156
OD_CYLINDER: -0.75
OS_SPHERE: +0.25
OS_CYLINDER: -0.75

## 2025-03-19 ASSESSMENT — TONOMETRY
OS_IOP_MMHG: 10
OD_IOP_MMHG: 10

## 2025-03-19 ASSESSMENT — LID EXAM ASSESSMENTS
OS_BLEPHARITIS: LUL 3+
OD_BLEPHARITIS: RUL 3+

## 2025-03-19 ASSESSMENT — CONFRONTATIONAL VISUAL FIELD TEST (CVF)
OS_FINDINGS: FULL
OD_FINDINGS: FULL

## 2025-03-19 ASSESSMENT — VISUAL ACUITY
OD_BCVA: 20/20
OS_BCVA: 20/20

## 2025-03-19 ASSESSMENT — SUPERFICIAL PUNCTATE KERATITIS (SPK)
OS_SPK: 2+
OD_SPK: 2+